# Patient Record
(demographics unavailable — no encounter records)

---

## 2025-01-15 NOTE — HISTORY OF PRESENT ILLNESS
[TextBox_4] : Lucila is here for consultation today. He is a healthy 5 month old male who was born at term after an unassisted conception and uneventful pregnancy. Last night, he was noted to have swelling in the right scrotum. PCP evaluated and referred to Pediatric Urology. Today it looks better than yesterday per parents. It does change sizes during the day, especially with abdominal straining, being smallest while sleeping and largest during the day and with straining. There is no associated pain or nausea/vomiting. Paternal aunt with history of umbilical hernia.  Early development, Lucila was born FT without complications and was discharged home with mom. No significant pmh. Not taking any medications. Has a cows protein allergy.

## 2025-01-15 NOTE — CONSULT LETTER
[FreeTextEntry1] : Dear Dr. Gonzalez,   I had the pleasure of seeing Lucila for follow up today. Below is my note regarding the office visit today.    Thank you so very much for allowing me to participate in Lucila's care. Please don't hesitate to call me should any questions or issues arise .    Sincerely,    Son Hsu MD, FACS, [facility]    Chief, Pediatric Urology    Professor of Urology and Pediatrics    [facility] [employer]    President, American Urological Association - [place] Section    Past-President, Societies for Pediatric Urology

## 2025-01-15 NOTE — PHYSICAL EXAM
[TextBox_92] : Parents served as chaperone for exam.   PENIS: Circumcised. No curvature, torsion, adhesions, skin bridges. Distinct penoscrotal junction. Distinct penopubic junction. Meatus at tip of the glans without apparent stenosis. No signs of infection.  TESTICLES: Bilateral testicles palpable in the dependent position of the scrotum, vertical lie, do no retract, without any masses, induration or tenderness, and approximately normal size, symmetric, and firm consistency.  SCROTAL/INGUINAL: Right palpable and reduceable inguinal hernia.

## 2025-01-15 NOTE — CONSULT LETTER
[FreeTextEntry1] : Dear Dr. Gonzalez,   I had the pleasure of seeing Lucila for follow up today. Below is my note regarding the office visit today.    Thank you so very much for allowing me to participate in Lucila's care. Please don't hesitate to call me should any questions or issues arise .    Sincerely,    Son Hsu MD, FACS, [facility]    Chief, Pediatric Urology    Professor of Urology and Pediatrics    [facility] [employer]    President, American Urological Association - [place] Section    Past-President, Societies for Pediatric Urology

## 2025-01-15 NOTE — REASON FOR VISIT
[Initial Consultation] : an initial consultation [PCP] : ~pcp~ [Parents] : parents [TextBox_50] : Hernia

## 2025-01-15 NOTE — ASSESSMENT
[FreeTextEntry1] : Lucila has a right inguinal hernia. I had a long discussion on the nature of inguinal hernias: anatomy using drawings, natural history and risks associated with prolonged observation. The general principles of the operation were discussed and drawn and the anticipated postoperative course, including the wound care and medications, was described. The probability of surgical success was discussed as well as the risk of possible complications which include but not limited to recurrent hernia or hydrocele formation, infection, bleeding, injury to the blood supply to the testicle and/or epididymis, injury to the vas deferens, testicle, or epididymis, testicular ischemia, atrophy or loss, bowel or omental injury. The signs and symptoms of incarceration were described and if they were to occur, immediate care in the closest emergency room should occur. Lucila's parents stated understanding the risks, benefits and alternatives, and that all questions were answered, and understood. The decision to proceed with inguinal hernia repair was made. Surgery will completed after the child is 6 months of age.

## 2025-05-22 NOTE — CONSULT LETTER
[FreeTextEntry1] : Dear Dr. EMA RODRIGUEZ,      I had the pleasure of seeing LOREE PHAM for follow up today.  Below is my note regarding the office visit today.      Thank you so very much for allowing me to participate in LOREE's care.  Please don't hesitate to call me should any questions or issues arise.         Sincerely,     Son Hsu MD, FACS, [facility]    Chief, Pediatric Urology     Professor of Urology and Pediatrics     [facility] [employer]         President, American U[employer] Association - [place] Section    Past-President, Societies for Pediatric Urology

## 2025-05-22 NOTE — HISTORY OF PRESENT ILLNESS
[TextBox_4] : LOREE presents today for a follow-up visit. At initial consultation with HOLA Howe (1/15/25), right inguinal hernia noted upon exam. Returns today for reexamination. No reported interval urologic issues.

## 2025-07-02 NOTE — PHYSICAL EXAM
[TextBox_92] : PENIS: Straight protuberant penis.  Meatus ample size in orthotopic position.   SCROTUM: Symmetric testes in dependent position without palpable mass.  Right silk stocking sign and small hydrocele

## 2025-07-02 NOTE — ASSESSMENT
[FreeTextEntry1] : LOREE continues to have a right inguinal hernia on today's exam. I discussed the implications and management options including observation and surgery. The family wishes to proceed with surgery. I reviewed the operation and the anticipated postoperative course. I reviewed the benefits and the risks as well as the common complications. All questions were answered.

## 2025-07-02 NOTE — CONSULT LETTER
[FreeTextEntry1] : Dear Dr. EMA RODRIGUEZ,      I had the pleasure of seeing LOREE PHAM for follow up today.  Below is my note regarding the office visit today.      Thank you so very much for allowing me to participate in LOREE's care.  Please don't hesitate to call me should any questions or issues arise.         Sincerely,     Son Hsu MD, FACS, [facility]    Chief, Pediatric Urology     Professor of Urology and Pediatrics     [facility] [employer]         President, American Urological Association - [place] Section    Past-President, Societies for Pediatric Urology

## 2025-07-02 NOTE — CONSULT LETTER
[FreeTextEntry1] : Dear Dr. EMA RODRIGUEZ,      I had the pleasure of seeing LOREE PHAM for follow up today.  Below is my note regarding the office visit today.      Thank you so very much for allowing me to participate in LOREE's care.  Please don't hesitate to call me should any questions or issues arise.         Sincerely,     Son Hsu MD, FACS, [facility]    Chief, Pediatric Urology     Professor of Urology and Pediatrics     [facility] [employer]         President, American Urological Association - [place] Section    Past-President, Societies for Pediatric Urology

## 2025-07-07 NOTE — CONSULT LETTER
[FreeTextEntry1] :   Dear Dr. EMA RODRIGUEZ,   Our mutual patient, LOREE PHAM underwent surgery today as outlined below. The procedure went well and he was discharged from the PACU after an uneventful stay. Discharge instructions were provided in writing. Instructions regarding follow up were also provided.   Sincerely,   Son Hsu MD, FACS, [facility] Chief, Pediatric Urology Professor of Urology and Pediatrics [facility] [employer] at [facility].   President, American Urological Association

## 2025-07-07 NOTE — PROCEDURE
[FreeTextEntry3] : RIGHT INGUINAL HERNIA REPAIR [FreeTextEntry5] : NONE [FreeTextEntry6] : FOLLOW UP 3 WEEKS

## 2025-07-07 NOTE — CONSULT LETTER
[FreeTextEntry1] :   Dear Dr. EMA RODRIGUEZ,   Our mutual patient, LOREE PHAM underwent surgery today as outlined below. The procedure went well and he was discharged from the PACU after an uneventful stay. Discharge instructions were provided in writing. Instructions regarding follow up were also provided.   Sincerely,   Son Hsu MD, FACS, [facility] Chief, Pediatric Urology Professor of Urology and Pediatrics [facility] [employer] at [facility].   President, American Urological Association